# Patient Record
Sex: FEMALE | Race: OTHER | NOT HISPANIC OR LATINO
[De-identification: names, ages, dates, MRNs, and addresses within clinical notes are randomized per-mention and may not be internally consistent; named-entity substitution may affect disease eponyms.]

---
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
## 2021-04-10 ENCOUNTER — EMERGENCY (EMERGENCY)
Facility: HOSPITAL | Age: 21
LOS: 1 days | Discharge: HOME | End: 2021-04-10
Attending: EMERGENCY MEDICINE
Payer: COMMERCIAL

## 2021-04-10 VITALS
DIASTOLIC BLOOD PRESSURE: 67 MMHG | HEIGHT: 62 IN | SYSTOLIC BLOOD PRESSURE: 107 MMHG | HEART RATE: 119 BPM | TEMPERATURE: 99 F | WEIGHT: 100.09 LBS | OXYGEN SATURATION: 100 % | RESPIRATION RATE: 18 BRPM

## 2021-04-10 VITALS
DIASTOLIC BLOOD PRESSURE: 61 MMHG | OXYGEN SATURATION: 100 % | RESPIRATION RATE: 18 BRPM | HEART RATE: 65 BPM | TEMPERATURE: 98 F | SYSTOLIC BLOOD PRESSURE: 97 MMHG

## 2021-04-10 LAB
ALBUMIN SERPL ELPH-MCNC: 4.3 G/DL — SIGNIFICANT CHANGE UP (ref 3.3–5)
ALP SERPL-CCNC: 62 U/L — SIGNIFICANT CHANGE UP (ref 40–120)
ALT FLD-CCNC: 10 U/L — SIGNIFICANT CHANGE UP (ref 10–45)
ANION GAP SERPL CALC-SCNC: 13 MMOL/L — SIGNIFICANT CHANGE UP (ref 5–17)
ANISOCYTOSIS BLD QL: SIGNIFICANT CHANGE UP
APAP SERPL-MCNC: <15 UG/ML — SIGNIFICANT CHANGE UP (ref 10–30)
APTT BLD: 25.9 SEC — LOW (ref 27.5–35.5)
AST SERPL-CCNC: 23 U/L — SIGNIFICANT CHANGE UP (ref 10–40)
BASE EXCESS BLDV CALC-SCNC: 0.6 MMOL/L — SIGNIFICANT CHANGE UP (ref -2–2)
BASOPHILS # BLD AUTO: 0.02 K/UL — SIGNIFICANT CHANGE UP (ref 0–0.2)
BASOPHILS NFR BLD AUTO: 0.3 % — SIGNIFICANT CHANGE UP (ref 0–2)
BILIRUB SERPL-MCNC: 0.4 MG/DL — SIGNIFICANT CHANGE UP (ref 0.2–1.2)
BUN SERPL-MCNC: 12 MG/DL — SIGNIFICANT CHANGE UP (ref 7–23)
CALCIUM SERPL-MCNC: 8.7 MG/DL — SIGNIFICANT CHANGE UP (ref 8.4–10.5)
CHLORIDE SERPL-SCNC: 105 MMOL/L — SIGNIFICANT CHANGE UP (ref 96–108)
CO2 BLDV-SCNC: 27 MMOL/L — SIGNIFICANT CHANGE UP (ref 22–30)
CO2 SERPL-SCNC: 22 MMOL/L — SIGNIFICANT CHANGE UP (ref 22–31)
CREAT SERPL-MCNC: 0.48 MG/DL — LOW (ref 0.5–1.3)
CRP SERPL-MCNC: <3 MG/L — SIGNIFICANT CHANGE UP (ref 0–4)
D DIMER BLD IA.RAPID-MCNC: 269 NG/ML DDU — HIGH
DACRYOCYTES BLD QL SMEAR: SLIGHT — SIGNIFICANT CHANGE UP
ELLIPTOCYTES BLD QL SMEAR: SLIGHT — SIGNIFICANT CHANGE UP
EOSINOPHIL # BLD AUTO: 0.02 K/UL — SIGNIFICANT CHANGE UP (ref 0–0.5)
EOSINOPHIL NFR BLD AUTO: 0.3 % — SIGNIFICANT CHANGE UP (ref 0–6)
ETHANOL SERPL-MCNC: SIGNIFICANT CHANGE UP MG/DL (ref 0–10)
GAS PNL BLDV: SIGNIFICANT CHANGE UP
GLUCOSE SERPL-MCNC: 139 MG/DL — HIGH (ref 70–99)
HCG SERPL-ACNC: <2 MIU/ML — SIGNIFICANT CHANGE UP
HCO3 BLDV-SCNC: 26 MMOL/L — SIGNIFICANT CHANGE UP (ref 21–29)
HCT VFR BLD CALC: 31.1 % — LOW (ref 34.5–45)
HGB BLD-MCNC: 9.4 G/DL — LOW (ref 11.5–15.5)
HYPOCHROMIA BLD QL: SLIGHT — SIGNIFICANT CHANGE UP
IMM GRANULOCYTES NFR BLD AUTO: 0.3 % — SIGNIFICANT CHANGE UP (ref 0–1.5)
INR BLD: 1.04 RATIO — SIGNIFICANT CHANGE UP (ref 0.88–1.16)
LACTATE SERPL-SCNC: 1.6 MMOL/L — SIGNIFICANT CHANGE UP (ref 0.7–2)
LYMPHOCYTES # BLD AUTO: 2.01 K/UL — SIGNIFICANT CHANGE UP (ref 1–3.3)
LYMPHOCYTES # BLD AUTO: 28.8 % — SIGNIFICANT CHANGE UP (ref 13–44)
MANUAL SMEAR VERIFICATION: SIGNIFICANT CHANGE UP
MCHC RBC-ENTMCNC: 17 PG — LOW (ref 27–34)
MCHC RBC-ENTMCNC: 30.2 GM/DL — LOW (ref 32–36)
MCV RBC AUTO: 56.1 FL — LOW (ref 80–100)
MICROCYTES BLD QL: SIGNIFICANT CHANGE UP
MONOCYTES # BLD AUTO: 0.47 K/UL — SIGNIFICANT CHANGE UP (ref 0–0.9)
MONOCYTES NFR BLD AUTO: 6.7 % — SIGNIFICANT CHANGE UP (ref 2–14)
NEUTROPHILS # BLD AUTO: 4.44 K/UL — SIGNIFICANT CHANGE UP (ref 1.8–7.4)
NEUTROPHILS NFR BLD AUTO: 63.6 % — SIGNIFICANT CHANGE UP (ref 43–77)
NRBC # BLD: 0 /100 WBCS — SIGNIFICANT CHANGE UP (ref 0–0)
PCO2 BLDV: 46 MMHG — HIGH (ref 39–42)
PH BLDV: 7.36 — SIGNIFICANT CHANGE UP (ref 7.35–7.45)
PLAT MORPH BLD: NORMAL — SIGNIFICANT CHANGE UP
PLATELET # BLD AUTO: 114 K/UL — LOW (ref 150–400)
PO2 BLDV: 39 MMHG — SIGNIFICANT CHANGE UP (ref 25–45)
POIKILOCYTOSIS BLD QL AUTO: SIGNIFICANT CHANGE UP
POLYCHROMASIA BLD QL SMEAR: SLIGHT — SIGNIFICANT CHANGE UP
POTASSIUM SERPL-MCNC: 4 MMOL/L — SIGNIFICANT CHANGE UP (ref 3.5–5.3)
POTASSIUM SERPL-SCNC: 4 MMOL/L — SIGNIFICANT CHANGE UP (ref 3.5–5.3)
PROCALCITONIN SERPL-MCNC: 0.04 NG/ML — SIGNIFICANT CHANGE UP (ref 0.02–0.1)
PROT SERPL-MCNC: 7.1 G/DL — SIGNIFICANT CHANGE UP (ref 6–8.3)
PROTHROM AB SERPL-ACNC: 12.5 SEC — SIGNIFICANT CHANGE UP (ref 10.6–13.6)
RBC # BLD: 5.54 M/UL — HIGH (ref 3.8–5.2)
RBC # FLD: 16.7 % — HIGH (ref 10.3–14.5)
RBC BLD AUTO: ABNORMAL
SALICYLATES SERPL-MCNC: <2 MG/DL — LOW (ref 15–30)
SAO2 % BLDV: 63 % — LOW (ref 67–88)
SARS-COV-2 RNA SPEC QL NAA+PROBE: DETECTED
SCHISTOCYTES BLD QL AUTO: SLIGHT — SIGNIFICANT CHANGE UP
SODIUM SERPL-SCNC: 140 MMOL/L — SIGNIFICANT CHANGE UP (ref 135–145)
TARGETS BLD QL SMEAR: SLIGHT — SIGNIFICANT CHANGE UP
TROPONIN T, HIGH SENSITIVITY RESULT: 7 NG/L — SIGNIFICANT CHANGE UP (ref 0–51)
WBC # BLD: 6.98 K/UL — SIGNIFICANT CHANGE UP (ref 3.8–10.5)
WBC # FLD AUTO: 6.98 K/UL — SIGNIFICANT CHANGE UP (ref 3.8–10.5)

## 2021-04-10 PROCEDURE — 85730 THROMBOPLASTIN TIME PARTIAL: CPT

## 2021-04-10 PROCEDURE — 0042T: CPT

## 2021-04-10 PROCEDURE — 80053 COMPREHEN METABOLIC PANEL: CPT

## 2021-04-10 PROCEDURE — 84702 CHORIONIC GONADOTROPIN TEST: CPT

## 2021-04-10 PROCEDURE — 80061 LIPID PANEL: CPT

## 2021-04-10 PROCEDURE — 85025 COMPLETE CBC W/AUTO DIFF WBC: CPT

## 2021-04-10 PROCEDURE — U0003: CPT

## 2021-04-10 PROCEDURE — 70496 CT ANGIOGRAPHY HEAD: CPT | Mod: 26,MA

## 2021-04-10 PROCEDURE — 93005 ELECTROCARDIOGRAM TRACING: CPT

## 2021-04-10 PROCEDURE — 82803 BLOOD GASES ANY COMBINATION: CPT

## 2021-04-10 PROCEDURE — 82728 ASSAY OF FERRITIN: CPT

## 2021-04-10 PROCEDURE — 70450 CT HEAD/BRAIN W/O DYE: CPT | Mod: 26,MA,59

## 2021-04-10 PROCEDURE — 99285 EMERGENCY DEPT VISIT HI MDM: CPT | Mod: 25

## 2021-04-10 PROCEDURE — 70496 CT ANGIOGRAPHY HEAD: CPT

## 2021-04-10 PROCEDURE — 82962 GLUCOSE BLOOD TEST: CPT

## 2021-04-10 PROCEDURE — 70450 CT HEAD/BRAIN W/O DYE: CPT

## 2021-04-10 PROCEDURE — 71045 X-RAY EXAM CHEST 1 VIEW: CPT

## 2021-04-10 PROCEDURE — 85610 PROTHROMBIN TIME: CPT

## 2021-04-10 PROCEDURE — 99285 EMERGENCY DEPT VISIT HI MDM: CPT

## 2021-04-10 PROCEDURE — 93010 ELECTROCARDIOGRAM REPORT: CPT

## 2021-04-10 PROCEDURE — 84484 ASSAY OF TROPONIN QUANT: CPT

## 2021-04-10 PROCEDURE — 83605 ASSAY OF LACTIC ACID: CPT

## 2021-04-10 PROCEDURE — 70498 CT ANGIOGRAPHY NECK: CPT | Mod: 26,MA

## 2021-04-10 PROCEDURE — 71045 X-RAY EXAM CHEST 1 VIEW: CPT | Mod: 26

## 2021-04-10 PROCEDURE — 85379 FIBRIN DEGRADATION QUANT: CPT

## 2021-04-10 PROCEDURE — 84145 PROCALCITONIN (PCT): CPT

## 2021-04-10 PROCEDURE — 80307 DRUG TEST PRSMV CHEM ANLYZR: CPT

## 2021-04-10 PROCEDURE — 86140 C-REACTIVE PROTEIN: CPT

## 2021-04-10 PROCEDURE — 70498 CT ANGIOGRAPHY NECK: CPT

## 2021-04-10 RX ORDER — SODIUM CHLORIDE 9 MG/ML
1000 INJECTION INTRAMUSCULAR; INTRAVENOUS; SUBCUTANEOUS ONCE
Refills: 0 | Status: COMPLETED | OUTPATIENT
Start: 2021-04-10 | End: 2021-04-10

## 2021-04-10 RX ADMIN — SODIUM CHLORIDE 1000 MILLILITER(S): 9 INJECTION INTRAMUSCULAR; INTRAVENOUS; SUBCUTANEOUS at 23:54

## 2021-04-10 NOTE — ED ADULT NURSE NOTE - OBJECTIVE STATEMENT
19 yo female from home, lethargic/ responds to verbal stimuli, A&OX3 when stimulated, BIB family for nausea/vomiting. Mother in [facility] ED for COVID symptoms /is COVID positive. PT c/o headache and n/v. Pt denies alcohol/drug use. On arrival, pt lethargic and not answering questions. Code stroke initiated by ED attending for AMS. Per Mother pt has had multiple negative COVID swabs but has been taking care of mother who is COVID positive. Pt denies fevers/chills/cough, SOB - afebrile on arrival orally. Pt denies abd pn, diarrhea, chx pn, LOC. PERRL. Neuro exam intact. See code stroke flowsheet for times and neuro assessment. CT and blood work obtained, results pending.

## 2021-04-10 NOTE — ED PROVIDER NOTE - OBJECTIVE STATEMENT
Patient presented to the emergency department with acute confusion and delayed responses but axox3. Patient was unsure as to why she was here but her mother is here with COVID-19 and she has been her mother's caretaker. Patient without complaint. No nausea/vomiting. Patient without headache. Patient is drowsy and slow to give history.

## 2021-04-10 NOTE — ED PROVIDER NOTE - PHYSICAL EXAMINATION
GEN: rousable, eyes open to voice, axox3 not oriented to situation  EYES: normal conjunctiva, perrl, eomi  ENT: NCAT, mildly dry MM, Trachea midline  CHEST/LUNGS: Non-tachypneic, CTAB, bilateral breath sounds  CARDIAC: Non-tachycardic, normal perfusion  ABDOMEN: Soft, NTND, No rebound/guarding  MSK: No edema, no gross deformity of extremities  SKIN: No rashes, no petechiae, no vesicles  NEURO: CN 2 normal sight, 3,4,6 eomi and eyelid movement normal, 5,7 normal gritting of teeth and opening of mouth and sensation to face, 8 normal hearing for patient, 9,10 normal swallowing and phonation, 11 normal shoulder shrug, 12 normal tongue movement and articulation, normal coordination, normal finger to nose but with poor effort, normal heel to shin, negative Romberg, no pronator drift, no gait instability, 5/5 strength in upper and lower extremities, normal sensory throughout, alert and oriented to person, place, time, and situation.   PSYCH: cooperative, with capacity and insight, answering directed questions

## 2021-04-10 NOTE — CONSULT NOTE ADULT - SUBJECTIVE AND OBJECTIVE BOX
HPI:  21 y/o RH woman with PMHx thalassemia minor, recent COVID exposure (tested negative 4/5) presents to ED for code stroke for altered mental status. Pt is lethargic and unable to provide a significant amount of history, but nodding/shaking head to questions. Pt's cousin was called for collateral history - reports that pt was in her normal state of health at 7pm earlier this evening. She helped pack bags to help her mother go to hospital for COVID symptoms. Cousin took pt's mother into the hospital ([facility]) and when she returned to the car, she found that pt was confused and inattentive, and had a brief episode of whole-body shaking without loss of consciousness or post-ictal period. No tongue biting or urinary incontinence. No prior episodes of seizures. Pt has been taking care of her mother at home and has been feeling congested over the past couple days. Cousin denies noticing facial droop, focal weakness, falls, syncope, head trauma.    LKN 4/10/21 7:00pm, preMRS 0, NIHSS 2 initially, improved to 0 on re-evaluation. No tPA given as no suspicion for acute ischemic event, no disabling neurological deficits. CTH negative for acute pathology. CTA H/N negative for LVO, therefore pt was not a candidate for MT. CTP negative for core infarct/penumbra.     PAST MEDICAL & SURGICAL HISTORY:  Thalassemia minor    Allergies  NKDA    SHx - No smoking, No ETOH, No drug abuse    Vital Signs Last 24 Hrs  T(C): 36.6 (10 Apr 2021 21:15), Max: 37 (10 Apr 2021 20:11)  T(F): 97.9 (10 Apr 2021 21:15), Max: 98.6 (10 Apr 2021 20:11)  HR: 86 (10 Apr 2021 21:15) (86 - 119)  BP: 106/72 (10 Apr 2021 21:15) (106/72 - 107/67)  BP(mean): 83 (10 Apr 2021 21:15) (83 - 83)  RR: 16 (10 Apr 2021 21:15) (16 - 18)  SpO2: 100% (10 Apr 2021 21:15) (100% - 100%)    PHYSICAL EXAM:  GENERAL: NAD, appears lethargic   HEENT: Normocephalic; conjunctivae and sclerae clear; moist mucous membranes;   NECK: Supple  ABDOMEN: Soft, Nontender, Nondistended; Bowel sounds present  EXTREMITIES: no cyanosis; no edema; no calf tenderness  SKIN: warm and dry; no rash             Neurological Exam:  - Mental Status: Lethargic, arousable with minor stimulus; speech is fluent with intact naming, repetition, and comprehension; follows commands  - Cranial Nerves II-XII:    II:  PERRL 3mm b/l; visual fields are full to confrontation  III, IV, VI:  EOMI, no nystagmus  V:  facial sensation is intact in the V1-V3 distribution bilaterally.  VII:  face is symmetric with normal eye closure and smile  VIII:  hearing is intact to finger rub  IX, X:  uvula is midline and soft palate rises symmetrically  XI:  head turning and shoulder shrug are intact bilaterally  XII:  tongue protrudes in the midline  - Motor:  strength is 5/5 throughout; normal muscle bulk and tone throughout; no pronator drift  - Reflexes:  2+ and symmetric at the biceps, triceps, brachioradialis, knees, and ankles;  plantar reflexes downgoing bilaterally  - Sensory:  intact to light touch and symmetric throughout  - Coordination:  finger-nose-finger without dysmetria; rapid alternating hand movements intact  - Gait: deferred    Other:                          9.4    6.98  )-----------( 114      ( 10 Apr 2021 20:56 )             31.1       Radiology  CT Brain Stroke Protocol (04.10.21 @ 21:05) >  IMPRESSION:  No CT evidence of acute intracranial hemorrhage, brain edema, or mass effect. If clinical symptoms persist or worsen, more sensitive evaluation with brain MRI may be obtained, if no contraindications exist.

## 2021-04-10 NOTE — ED PROVIDER NOTE - CLINICAL SUMMARY MEDICAL DECISION MAKING FREE TEXT BOX
Kevin Ruiz MD, FACEP: In this physician's medical judgement based on clinical history and physical exam, patient with confusion and COVID-19. Possible seizure or COVID-19 neuritis vs fatigue. Patient c/s was within normal limits, patient with mild confusion which resolved during visit.  code stroke called and COVID-19 labs ordered  will get iv, ct head, cta head/neck, neuro stroke code called and within normal limits on perfusion.  no sign dural sinus venous thrombosis on ct when reviewed with radiology  patient wishing to go home and will discharge to care of family

## 2021-04-10 NOTE — CONSULT NOTE ADULT - ASSESSMENT
21 y/o RH woman with PMHx thalassemia minor, recent COVID exposure (tested negative 4/5) presents to ED for code stroke for altered mental status. LKN 4/10/21 7:00pm, preMRS 0, NIHSS 2 initially, improved to 0 on re-evaluation. No tPA given as no suspicion for acute ischemic event, no neurological deficits on exam. CTH negative for acute pathology. CTA H/N negative for LVO, therefore pt was not a candidate for MT. CTP negative for core infarct/penumbra.    Impression: Altered mental status secondary to possible COVID encephalopathy    Recommendations:  [] No neurological contraindication to discharge, no further inpatient workup required  [] COVID PCR, CXR, consider ordering inflammatory markers  [] Monitor respiratory status  [] IVF, supportive care    Betty Limon, DO  PGY-2  Neurology Resident

## 2021-04-10 NOTE — ED PROVIDER NOTE - NSFOLLOWUPCLINICS_GEN_ALL_ED_FT
[facility]  Neurology  [address] [place] [address] [address] [place] [address]  Phone: [phone]  Fax:

## 2021-04-10 NOTE — ED PROVIDER NOTE - PATIENT PORTAL LINK FT
You can access the FollowMyHealth Patient Portal offered by [facility] by registering at the following website: [email or web address]. By joining [employer]’s FollowMyHealth portal, you will also be able to view your health information using other applications (apps) compatible with our system.

## 2021-04-10 NOTE — ED ADULT TRIAGE NOTE - CHIEF COMPLAINT QUOTE
nausea vomiting  patient is lethargic - mother also here as patient and is covid positive. mother states that patient has had multiple negative swabs.

## 2021-04-10 NOTE — ED PROVIDER NOTE - NSFOLLOWUPINSTRUCTIONS_ED_ALL_ED_FT
Follow up with your neurologist in 2-3 days or call our clinic at [phone]. You will need to follow up after your course of COVID-19 is resolved, especially for any (cognitive) mental deficits.    There were no signs of an emergency medical condition on completion of today's workup.  You will need further medical care and evaluation. A presumptive diagnosis of COVID-19 with confusion has been made, however further evaluation may be required by your primary care doctor or specialist for a definitive diagnosis.      Follow up with your medical doctor in 2-3 days or call our clinic at [phone] and state you were seen in the Emergency Department and would like to be seen in clinic. You may also call [phone] to speak with a representative to assist follow up care with medicine, surgery, or specialists.    If you are having pain, take Tylenol/acetaminophen 1 g every six hours and supplement (if allowed by your physician, and if you're not having gastric/gastrointestinal/stomach/intestinal problems) with ibuprofen 600 mg, with food or milk/maalox, every six hours which can be taken three hours apart from the Tylenol to have a layered effect.     Be sure to take no more than 4000mg or 4g of Tylenol/acetaminophen in a 24 hour period. Be sure to check your other medications to see if they include Tylenol/acetaminophen and include them in your calculations to ensure you do not take more than 4000mg or 4g of Tylenol/acetaminophen a day.    Drink at least 2 Liters or 64 Ounces of water each day (UNLESS you are supposed to restrict fluids or have a history of congestive heart failure (CHF)).    Return for any persistent, worsening symptoms, or ANY concerns at all.

## 2021-04-10 NOTE — ED PROVIDER NOTE - PROGRESS NOTE DETAILS
patient ambulatory in emergency department, axox3, patient educated that her mother was being discharged but offered patient admission secondary to confusion today. Patient feels well and is at baseline per her and would like to go home. No neuro deficits Admission is offered to the patient, however the patient declines.  They would like to go home and follow up with their private physician. The patient was serially evaluated throughout emergency department course. There was no acute deterioration up to this time in the department. Patient has demonstrated clinical improvement and is stable, feels better at this time according to emergency department team. Agree with goals/plan of emergency department care as described in this physician's electronic medical record, including diagnostics, therapeutics and consultation as clinically warranted. Will discharge home with close outpatient follow up with primary care physician/provider and specialist if necessary. The patient and/or family was educated on concerning signs and features to return to the emergency department, in layman terms, including but not limited to: nausea, vomiting, fever, chills, persistent/worsening symptoms or any concerns at all. No immediate life threatening issues present on history, clinical exam, or any diagnostic evaluation. The patient is a safe disposition home, has capacity and insight into their condition, is ambulatory in the Emergency Department with no further questions and will follow up with their doctor(s) this week. The patient and/or family were given the opportunity to ask questions and have them answered in full. The patient and/or family are with capacity and insight into the situation, treatment, risks, benefits, alternative therapies, and understand that they can ask any further questions if needed. Patient and/or family/guardian understand anticipatory guidance were given strict return and follow up precautions.  The patient and/or family/guardian have been informed of all concerning signs and symptoms to return to Emergency Department, the necessity to follow up with the PMD/Clinic/follow up provided within 2-3 days was explained, and the patient and/or family reports understanding of above with capacity and insight. The patient and/or family/guardian were informed of any results of their tests and are were encouraged to follow up on the findings with their doctor as well as the need to inform their doctor of any results. The patient and/or family/guardian are aware of the need to follow up with repeat testing as applicable and report understanding of the above with capacity and insight. The patient and/or family/guardian was made aware of any pending test results at the time of discharge and of the need to call back for the final results a well as the need to inform their doctor of the results.

## 2021-04-10 NOTE — ED PROVIDER NOTE - NS ED ROS FT
+ sick contact  no diarrhea  no n/v   no chest pain   no headache  no weakness  no numbness  ROS as per HPI, attending statement, or otherwise negative.

## 2021-04-10 NOTE — ED ADULT NURSE REASSESSMENT NOTE - NS ED NURSE REASSESS COMMENT FT1
Patient's jewelry removed by RN in CT, placed in specimen cup, patient sticker on cup, and placed in patient's sweatshirt pocket by RN. Patient lethargic, but made aware by RN jewelry in sweatshirt.

## 2021-04-11 LAB
CHOLEST SERPL-MCNC: 93 MG/DL — SIGNIFICANT CHANGE UP
FERRITIN SERPL-MCNC: 42 NG/ML — SIGNIFICANT CHANGE UP (ref 15–150)
HDLC SERPL-MCNC: 31 MG/DL — LOW
LIPID PNL WITH DIRECT LDL SERPL: 55 MG/DL — SIGNIFICANT CHANGE UP
NON HDL CHOLESTEROL: 62 MG/DL — SIGNIFICANT CHANGE UP
TRIGL SERPL-MCNC: 35 MG/DL — SIGNIFICANT CHANGE UP

## 2021-04-11 NOTE — ED ADULT NURSE REASSESSMENT NOTE - NS ED NURSE REASSESS COMMENT FT1
Patient called ED after discharge stating she left her phone in the ED. Patient did not arrive in [place]  with a phone, only jewelry that was labeled and returned to patient. Patient made aware room was searched by 2 RNs and 1 ancillary, no personal items were left behind after d/c.

## 2021-04-11 NOTE — ED ADULT NURSE REASSESSMENT NOTE - NS ED NURSE REASSESS COMMENT FT1
Patient discharged from ED. IV removed, discharge paperwork provided. Verbalized understanding of teaching. Vital signs stable, afebrile. Patient leaving unit in wheelchair, free from harm. Discharge paperwork handed to patient's mother outside of triage byr RN.

## 2021-04-15 LAB — DRUG SCREEN, SERUM: ABNORMAL

## 2021-12-17 NOTE — STROKE CODE NOTE - NIH STROKE SCALE: 2. BEST GAZE, QM
See MyChart message.    Once pharmacy is indicated, we can continue the refill request.    Samira Pineda,   [facility]     (0) Normal
